# Patient Record
Sex: FEMALE | Race: WHITE | NOT HISPANIC OR LATINO
[De-identification: names, ages, dates, MRNs, and addresses within clinical notes are randomized per-mention and may not be internally consistent; named-entity substitution may affect disease eponyms.]

---

## 2018-01-19 ENCOUNTER — RX RENEWAL (OUTPATIENT)
Age: 43
End: 2018-01-19

## 2018-01-19 PROBLEM — Z00.00 ENCOUNTER FOR PREVENTIVE HEALTH EXAMINATION: Status: ACTIVE | Noted: 2018-01-19

## 2018-01-31 ENCOUNTER — APPOINTMENT (OUTPATIENT)
Dept: GASTROENTEROLOGY | Facility: CLINIC | Age: 43
End: 2018-01-31
Payer: COMMERCIAL

## 2018-01-31 VITALS
SYSTOLIC BLOOD PRESSURE: 129 MMHG | HEART RATE: 83 BPM | BODY MASS INDEX: 26.37 KG/M2 | DIASTOLIC BLOOD PRESSURE: 86 MMHG | WEIGHT: 168 LBS | HEIGHT: 67 IN | OXYGEN SATURATION: 98 %

## 2018-01-31 DIAGNOSIS — K52.9 NONINFECTIVE GASTROENTERITIS AND COLITIS, UNSPECIFIED: ICD-10-CM

## 2018-01-31 DIAGNOSIS — Z86.39 PERSONAL HISTORY OF OTHER ENDOCRINE, NUTRITIONAL AND METABOLIC DISEASE: ICD-10-CM

## 2018-01-31 DIAGNOSIS — K30 FUNCTIONAL DYSPEPSIA: ICD-10-CM

## 2018-01-31 DIAGNOSIS — R10.9 UNSPECIFIED ABDOMINAL PAIN: ICD-10-CM

## 2018-01-31 DIAGNOSIS — Z86.79 PERSONAL HISTORY OF OTHER DISEASES OF THE CIRCULATORY SYSTEM: ICD-10-CM

## 2018-01-31 DIAGNOSIS — K58.9 IRRITABLE BOWEL SYNDROME W/OUT DIARRHEA: ICD-10-CM

## 2018-01-31 PROCEDURE — 99202 OFFICE O/P NEW SF 15 MIN: CPT

## 2018-01-31 RX ORDER — METFORMIN HYDROCHLORIDE 500 MG/1
500 TABLET, COATED ORAL
Refills: 0 | Status: ACTIVE | COMMUNITY

## 2018-01-31 RX ORDER — ESTRADIOL 0.04 MG/D
0.04 PATCH, EXTENDED RELEASE TRANSDERMAL
Refills: 0 | Status: ACTIVE | COMMUNITY

## 2018-01-31 RX ORDER — LISINOPRIL 10 MG/1
10 TABLET ORAL
Refills: 0 | Status: ACTIVE | COMMUNITY

## 2018-03-05 ENCOUNTER — APPOINTMENT (OUTPATIENT)
Dept: GASTROENTEROLOGY | Facility: CLINIC | Age: 43
End: 2018-03-05
Payer: COMMERCIAL

## 2018-03-05 PROCEDURE — 91065 BREATH HYDROGEN/METHANE TEST: CPT

## 2018-03-06 ENCOUNTER — OTHER (OUTPATIENT)
Age: 43
End: 2018-03-06

## 2021-10-13 ENCOUNTER — APPOINTMENT (OUTPATIENT)
Dept: GASTROENTEROLOGY | Facility: CLINIC | Age: 46
End: 2021-10-13
Payer: COMMERCIAL

## 2021-10-13 VITALS
SYSTOLIC BLOOD PRESSURE: 125 MMHG | HEART RATE: 76 BPM | DIASTOLIC BLOOD PRESSURE: 80 MMHG | HEIGHT: 67 IN | WEIGHT: 150 LBS | OXYGEN SATURATION: 100 % | BODY MASS INDEX: 23.54 KG/M2

## 2021-10-13 DIAGNOSIS — Z87.19 PERSONAL HISTORY OF OTHER DISEASES OF THE DIGESTIVE SYSTEM: ICD-10-CM

## 2021-10-13 DIAGNOSIS — C56.9 MALIGNANT NEOPLASM OF UNSPECIFIED OVARY: ICD-10-CM

## 2021-10-13 PROCEDURE — 99214 OFFICE O/P EST MOD 30 MIN: CPT

## 2021-10-13 RX ORDER — DICYCLOMINE HYDROCHLORIDE 10 MG/1
10 CAPSULE ORAL
Qty: 90 | Refills: 5 | Status: DISCONTINUED | COMMUNITY
Start: 2018-01-31 | End: 2021-10-13

## 2021-10-13 RX ORDER — FENOFIBRATE 150 MG/1
CAPSULE ORAL
Refills: 0 | Status: ACTIVE | COMMUNITY

## 2021-10-13 RX ORDER — DICYCLOMINE HYDROCHLORIDE 10 MG/1
10 CAPSULE ORAL
Qty: 90 | Refills: 0 | Status: DISCONTINUED | COMMUNITY
Start: 2018-01-19 | End: 2021-10-13

## 2021-10-13 NOTE — HISTORY OF PRESENT ILLNESS
[de-identified] : Nilsa Nguyen MD\par [address]\[place] [address] \par \par Very pleasant 46-year-old female\par \par Since being seen, ovarian cancer treated with laparotomy with debulking and total abdominal hysterectomy and oophorectomy, radiation and chemotherapy\par \par Apparently clinically in remission with no recent therapy in the last year\par \par Followed closely by oncology\par \par Episode of discomfort with loose bowel movements this summer suggestive of diverticular disease, diverticulosis on CAT scan she reports that outside radiology\par \par Currently clinically doing well with fairly normal bowel movements, maybe one episode of diarrhea in a week\par \par No blood or mucus per rectum\par \par Currently no abdominal pain\par \par Appetite is good, no weight loss, no heartburn, indigestion no dysphagia\par \par No family history of any GI intestinal malignancies

## 2021-10-13 NOTE — ASSESSMENT
[FreeTextEntry1] : Impression\par \par Possible diverticulitis in the summer\par \par Clinically doing well at this time\par \par No colonoscopy in the last 5 years\par \par Diverticular disease on CAT scan for follow-up of her ovarian cancer\par \par Ovarian cancer apparently clinically in remission after debulking surgery, radiation and chemotherapy\par \par Suggest\par \par Healthy high-fiber diet\par \par Fiber supplement if needed\par \par Colonoscopy\par \par Suprep\par \par Risks/benefits:\par The procedure, the risks and benefits and alternatives have been reviewed in great detail with the patient.  Risks including, but not limited to sedation such as cardiac and pulmonary compromise, the procedure itself such as bleeding requiring hospitalization, transfusion, surgery, temporary or permanent colostomy.  Perforation or puncture of the requiring hospitalization, surgery, temporary colostomy.\par It has been explained to the patient that though colonoscopy is thought to be the best screening exam for colon cancer and polyps, no screening exam can find all colon polyps or cancers.  \par The patient expresses understanding of the procedure and consents to undergoing the procedure.\par \par

## 2021-10-13 NOTE — REASON FOR VISIT
[FreeTextEntry1] : Covered from episode of probable diverticulitis, doing well, ovarian cancer with debulking, radiation and chemotherapy since being seen but apparently clinically in remission at this time, need for screening colonoscopy

## 2021-10-13 NOTE — CONSULT LETTER
[Dear  ___] : Dear  [unfilled], [Consult Letter:] : I had the pleasure of evaluating your patient, [unfilled]. [Please see my note below.] : Please see my note below. [Consult Closing:] : Thank you very much for allowing me to participate in the care of this patient.  If you have any questions, please do not hesitate to contact me. [Sincerely,] : Sincerely, [FreeTextEntry2] : Nilsa Nguyen MD\par [address] [facility]\[address]\[place] [address]  [FreeTextEntry3] : Mason Loredo MD\par  [DrMelinda  ___] : Dr. DAMON

## 2021-10-13 NOTE — PHYSICAL EXAM
[General Appearance - Alert] : alert [General Appearance - In No Acute Distress] : in no acute distress [Sclera] : the sclera and conjunctiva were normal [Neck Appearance] : the appearance of the neck was normal [Neck Cervical Mass (___cm)] : no neck mass was observed [Jugular Venous Distention Increased] : there was no jugular-venous distention [Auscultation Breath Sounds / Voice Sounds] : lungs were clear to auscultation bilaterally [Heart Rate And Rhythm] : heart rate was normal and rhythm regular [Heart Sounds] : normal S1 and S2 [Heart Sounds Gallop] : no gallops [Murmurs] : no murmurs [Heart Sounds Pericardial Friction Rub] : no pericardial rub [Full Pulse] : the pedal pulses are present [Edema] : there was no peripheral edema [Bowel Sounds] : normal bowel sounds [Abdomen Soft] : soft [Abdomen Tenderness] : non-tender [Abdomen Mass (___ Cm)] : no abdominal mass palpated [FreeTextEntry1] : Form scar in the mid abdomen [Cervical Lymph Nodes Enlarged Posterior Bilaterally] : posterior cervical [Cervical Lymph Nodes Enlarged Anterior Bilaterally] : anterior cervical [Supraclavicular Lymph Nodes Enlarged Bilaterally] : supraclavicular [Axillary Lymph Nodes Enlarged Bilaterally] : axillary [Femoral Lymph Nodes Enlarged Bilaterally] : femoral [Inguinal Lymph Nodes Enlarged Bilaterally] : inguinal [Skin Color & Pigmentation] : normal skin color and pigmentation [Skin Turgor] : normal skin turgor [] : no rash [No Focal Deficits] : no focal deficits [Oriented To Time, Place, And Person] : oriented to person, place, and time [Impaired Insight] : insight and judgment were intact [Affect] : the affect was normal

## 2021-11-29 DIAGNOSIS — Z20.822 CONTACT WITH AND (SUSPECTED) EXPOSURE TO COVID-19: ICD-10-CM

## 2021-11-29 DIAGNOSIS — Z01.818 ENCOUNTER FOR OTHER PREPROCEDURAL EXAMINATION: ICD-10-CM

## 2021-12-07 RX ORDER — SODIUM SULFATE, POTASSIUM SULFATE, MAGNESIUM SULFATE 17.5; 3.13; 1.6 G/ML; G/ML; G/ML
17.5-3.13-1.6 SOLUTION, CONCENTRATE ORAL
Qty: 1 | Refills: 0 | Status: ACTIVE | COMMUNITY
Start: 2021-10-13 | End: 1900-01-01

## 2021-12-10 ENCOUNTER — APPOINTMENT (OUTPATIENT)
Dept: GASTROENTEROLOGY | Facility: AMBULATORY MEDICAL SERVICES | Age: 46
End: 2021-12-10
Payer: COMMERCIAL

## 2021-12-10 PROCEDURE — 45378 DIAGNOSTIC COLONOSCOPY: CPT

## 2021-12-20 ENCOUNTER — NON-APPOINTMENT (OUTPATIENT)
Age: 46
End: 2021-12-20

## 2022-11-16 ENCOUNTER — OFFICE (OUTPATIENT)
Dept: URBAN - METROPOLITAN AREA CLINIC 70 | Facility: CLINIC | Age: 47
Setting detail: OPHTHALMOLOGY
End: 2022-11-16
Payer: COMMERCIAL

## 2022-11-16 DIAGNOSIS — H40.033: ICD-10-CM

## 2022-11-16 DIAGNOSIS — H35.54: ICD-10-CM

## 2022-11-16 PROCEDURE — 76513 OPH US DX ANT SGM US UNI/BI: CPT | Performed by: OPHTHALMOLOGY

## 2022-11-16 PROCEDURE — 92014 COMPRE OPH EXAM EST PT 1/>: CPT | Performed by: OPHTHALMOLOGY

## 2022-11-16 ASSESSMENT — REFRACTION_MANIFEST
OD_SPHERE: -0.25
OD_VA1: 20/20
OS_VA1: 20/20
OS_SPHERE: -0.75
OD_AXIS: 110
OS_CYLINDER: -0.25
OD_ADD: +1.75
OS_ADD: +1.75
OS_AXIS: 115
OD_CYLINDER: -0.25

## 2022-11-16 ASSESSMENT — KERATOMETRY
OS_K2POWER_DIOPTERS: 40.50
OS_AXISANGLE_DEGREES: 007
OD_AXISANGLE_DEGREES: 000
OS_K1POWER_DIOPTERS: 013
OD_K1POWER_DIOPTERS: 39.25
OD_K2POWER_DIOPTERS: 39.25

## 2022-11-16 ASSESSMENT — REFRACTION_AUTOREFRACTION
OS_CYLINDER: -0.75
OS_SPHERE: -0.25
OD_CYLINDER: SPH
OD_SPHERE: -0.50
OS_AXIS: 106

## 2022-11-16 ASSESSMENT — SPHEQUIV_DERIVED
OS_SPHEQUIV: -0.625
OD_SPHEQUIV: -0.375
OS_SPHEQUIV: -0.875

## 2022-11-16 ASSESSMENT — AXIALLENGTH_DERIVED
OD_AL: 25.4322
OS_AL: 32.37
OS_AL: 32.55

## 2022-11-16 ASSESSMENT — REFRACTION_CURRENTRX
OD_AXIS: 107
OS_AXIS: 112
OD_SPHERE: -0.25
OS_CYLINDER: -0.25
OS_OVR_VA: 20/
OD_OVR_VA: 20/
OS_SPHERE: -0.75
OD_CYLINDER: -0.25

## 2022-11-16 ASSESSMENT — VISUAL ACUITY
OD_BCVA: 20/25+2
OS_BCVA: 20/20-1

## 2022-11-16 ASSESSMENT — SUPERFICIAL PUNCTATE KERATITIS (SPK)
OD_SPK: 1+
OS_SPK: 1+

## 2022-11-16 ASSESSMENT — CONFRONTATIONAL VISUAL FIELD TEST (CVF)
OD_FINDINGS: FULL
OS_FINDINGS: FULL

## 2023-11-29 ENCOUNTER — OFFICE (OUTPATIENT)
Dept: URBAN - METROPOLITAN AREA CLINIC 70 | Facility: CLINIC | Age: 48
Setting detail: OPHTHALMOLOGY
End: 2023-11-29
Payer: COMMERCIAL

## 2023-11-29 DIAGNOSIS — H35.54: ICD-10-CM

## 2023-11-29 DIAGNOSIS — H16.223: ICD-10-CM

## 2023-11-29 DIAGNOSIS — H40.033: ICD-10-CM

## 2023-11-29 DIAGNOSIS — E11.9: ICD-10-CM

## 2023-11-29 PROCEDURE — 92250 FUNDUS PHOTOGRAPHY W/I&R: CPT | Performed by: OPHTHALMOLOGY

## 2023-11-29 PROCEDURE — 92014 COMPRE OPH EXAM EST PT 1/>: CPT | Performed by: OPHTHALMOLOGY

## 2023-11-29 ASSESSMENT — REFRACTION_CURRENTRX
OS_SPHERE: -0.75
OS_OVR_VA: 20/
OS_VPRISM_DIRECTION: SV
OD_AXIS: 110
OD_OVR_VA: 20/
OD_SPHERE: -0.25
OD_SPHERE: +1.00
OS_CYLINDER: -0.25
OS_AXIS: 111
OD_VPRISM_DIRECTION: SV
OS_CYLINDER: SPH
OS_OVR_VA: 20/
OD_CYLINDER: SPH
OD_CYLINDER: -0.25
OD_OVR_VA: 20/
OS_SPHERE: +1.00
OD_VPRISM_DIRECTION: SV
OS_VPRISM_DIRECTION: SV

## 2023-11-29 ASSESSMENT — REFRACTION_MANIFEST
OD_AXIS: 110
OS_SPHERE: -0.75
OS_ADD: +1.75
OD_ADD: +1.75
OD_CYLINDER: -0.25
OS_CYLINDER: -0.25
OS_VA1: 20/20
OS_AXIS: 115
OD_VA1: 20/20
OD_SPHERE: -0.25

## 2023-11-29 ASSESSMENT — SUPERFICIAL PUNCTATE KERATITIS (SPK)
OS_SPK: 1+
OD_SPK: 1+

## 2023-11-29 ASSESSMENT — REFRACTION_AUTOREFRACTION
OD_AXIS: 093
OD_CYLINDER: -0.25
OS_CYLINDER: -0.50
OD_SPHERE: -0.50
OS_AXIS: 120
OS_SPHERE: -0.75

## 2023-11-29 ASSESSMENT — SPHEQUIV_DERIVED
OD_SPHEQUIV: -0.625
OS_SPHEQUIV: -0.875
OD_SPHEQUIV: -0.375
OS_SPHEQUIV: -1

## 2023-11-29 ASSESSMENT — CONFRONTATIONAL VISUAL FIELD TEST (CVF)
OD_FINDINGS: FULL
OS_FINDINGS: FULL

## 2024-12-04 ENCOUNTER — OFFICE (OUTPATIENT)
Dept: URBAN - METROPOLITAN AREA CLINIC 70 | Facility: CLINIC | Age: 49
Setting detail: OPHTHALMOLOGY
End: 2024-12-04
Payer: COMMERCIAL

## 2024-12-04 DIAGNOSIS — H16.223: ICD-10-CM

## 2024-12-04 DIAGNOSIS — H40.033: ICD-10-CM

## 2024-12-04 DIAGNOSIS — E11.9: ICD-10-CM

## 2024-12-04 DIAGNOSIS — H35.54: ICD-10-CM

## 2024-12-04 DIAGNOSIS — D31.40: ICD-10-CM

## 2024-12-04 PROCEDURE — 92250 FUNDUS PHOTOGRAPHY W/I&R: CPT | Performed by: OPHTHALMOLOGY

## 2024-12-04 PROCEDURE — 92014 COMPRE OPH EXAM EST PT 1/>: CPT | Performed by: OPHTHALMOLOGY

## 2024-12-04 ASSESSMENT — REFRACTION_CURRENTRX
OS_OVR_VA: 20/
OS_AXIS: 111
OD_SPHERE: +1.00
OD_CYLINDER: -0.25
OD_OVR_VA: 20/
OS_OVR_VA: 20/
OD_SPHERE: -0.25
OS_CYLINDER: SPH
OD_OVR_VA: 20/
OD_AXIS: 110
OD_VPRISM_DIRECTION: SV
OD_CYLINDER: SPH
OD_VPRISM_DIRECTION: SV
OS_VPRISM_DIRECTION: SV
OS_SPHERE: -0.75
OS_SPHERE: +1.00
OS_VPRISM_DIRECTION: SV
OS_CYLINDER: -0.25

## 2024-12-04 ASSESSMENT — REFRACTION_AUTOREFRACTION
OS_SPHERE: -0.50
OS_AXIS: 1133
OD_AXIS: 118
OS_CYLINDER: -0.50
OD_SPHERE: -0.25
OD_CYLINDER: -0.25

## 2024-12-04 ASSESSMENT — SUPERFICIAL PUNCTATE KERATITIS (SPK)
OS_SPK: 1+
OD_SPK: 1+

## 2024-12-04 ASSESSMENT — VISUAL ACUITY
OS_BCVA: 20/20
OD_BCVA: 20/25-

## 2024-12-04 ASSESSMENT — KERATOMETRY
OS_AXISANGLE_DEGREES: 021
OD_AXISANGLE_DEGREES: 089
OD_K1POWER_DIOPTERS: 38.75
OD_K2POWER_DIOPTERS: 39.75
OS_K2POWER_DIOPTERS: 40.25
OS_K1POWER_DIOPTERS: 39.75

## 2024-12-04 ASSESSMENT — CONFRONTATIONAL VISUAL FIELD TEST (CVF)
OD_FINDINGS: FULL
OS_FINDINGS: FULL

## 2024-12-04 ASSESSMENT — REFRACTION_MANIFEST
OD_SPHERE: -0.25
OS_AXIS: 115
OD_VA1: 20/20
OS_VA1: 20/20
OD_AXIS: 110
OS_CYLINDER: -0.25
OD_CYLINDER: -0.25
OS_ADD: +1.75
OD_ADD: +1.75
OS_SPHERE: -0.75